# Patient Record
Sex: FEMALE
[De-identification: names, ages, dates, MRNs, and addresses within clinical notes are randomized per-mention and may not be internally consistent; named-entity substitution may affect disease eponyms.]

---

## 2020-05-09 ENCOUNTER — HOSPITAL ENCOUNTER (EMERGENCY)
Dept: HOSPITAL 5 - ED | Age: 20
Discharge: HOME | End: 2020-05-09
Payer: SELF-PAY
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
## 2020-05-09 DIAGNOSIS — S71.152A: ICD-10-CM
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
## 2020-05-09 DIAGNOSIS — Z79.899: ICD-10-CM

## 2020-05-09 DIAGNOSIS — S71.151A: Primary | ICD-10-CM

## 2020-05-09 PROCEDURE — 99282 EMERGENCY DEPT VISIT SF MDM: CPT

## 2020-05-09 PROCEDURE — 90471 IMMUNIZATION ADMIN: CPT

## 2020-05-09 PROCEDURE — 90715 TDAP VACCINE 7 YRS/> IM: CPT

## 2020-05-09 NOTE — EMERGENCY DEPARTMENT REPORT
ED Animal Bite HPI





- General


Chief Complaint: Animal Bite


Stated Complaint: DOG BITE


Time Seen by Provider: 05/09/20 09:42


Source: patient


Mode of arrival: Ambulatory


Limitations: No Limitations





- History of Present Illness


Initial Comments: 





20-year-old  female presents to the emergency room for dog bite to the 

left hip and right leg by father-in-law's dog.  Patient reports she is not sure 

when she has had her last tetanus.  It was reported that her father-in-law 

states that the dog has all of his vaccines.  Patient denies any past medical h

istory has no known drug allergies he currently takes no medications.  Last 

menstrual period was 4/25/2020 no possibility of being pregnant per patient.


MD Complaint: animal bite


Onset/Timing: 15


-: minutes(s)


Left: Thigh, Right: Thigh


Animal: dog


Description: household pet, immunizations UTD


Mechanism: bite, scratch


Severity scale (0 -10): 10


Context: playing with animal





- Related Data


Patient Tetanus UTD: No


                                  Previous Rx's











 Medication  Instructions  Recorded  Last Taken  Type


 


Acetaminophen/Codeine [Tylenol 1 tab PO Q6H PRN #12 tab 05/09/20 Unknown Rx





/Codeine # 3 tab]    


 


Amoxicillin/K Clav Tab [Augmentin 1 each PO Q8HR 10 Days #30 tablet 05/09/20 

Unknown Rx





500 MG TAB]    


 


Ibuprofen [Motrin 600 MG tab] 600 mg PO Q8H PRN #30 tablet 05/09/20 Unknown Rx











                                    Allergies











Allergy/AdvReac Type Severity Reaction Status Date / Time


 


No Known Allergies Allergy   Unverified 05/09/20 09:27














ED Review of Systems


ROS: 


Stated complaint: DOG BITE


Other details as noted in HPI








ED Past Medical Hx





- Past Medical History


Previous Medical History?: No





- Surgical History


Past Surgical History?: No





- Social History


Smoking Status: Never Smoker


Substance Use Type: None





- Medications


Home Medications: 


                                Home Medications











 Medication  Instructions  Recorded  Confirmed  Last Taken  Type


 


Acetaminophen/Codeine [Tylenol 1 tab PO Q6H PRN #12 tab 05/09/20  Unknown Rx





/Codeine # 3 tab]     


 


Amoxicillin/K Clav Tab [Augmentin 1 each PO Q8HR 10 Days #30 tablet 05/09/20  

Unknown Rx





500 MG TAB]     


 


Ibuprofen [Motrin 600 MG tab] 600 mg PO Q8H PRN #30 tablet 05/09/20  Unknown Rx














ED Physical Exam





- General


Limitations: No Limitations


General appearance: alert, in no apparent distress





- Head


Head exam: Present: atraumatic, normocephalic





- Eye


Eye exam: Present: normal appearance





- ENT


ENT exam: Present: mucous membranes moist





- Neck


Neck exam: Present: normal inspection, full ROM





- Respiratory


Respiratory exam: Present: normal lung sounds bilaterally.  Absent: respiratory 

distress





- Cardiovascular


Cardiovascular Exam: Present: regular rate, normal rhythm.  Absent: systolic 

murmur, diastolic murmur, rubs, gallop





- GI/Abdominal


GI/Abdominal exam: Present: soft.  Absent: distended, tenderness





- Extremities Exam


Extremities exam: Present: normal inspection





- Back Exam


Back exam: Present: normal inspection





- Neurological Exam


Neurological exam: Present: alert, oriented X3, normal gait





- Psychiatric


Psychiatric exam: Present: normal affect, normal mood





- Skin


Skin exam: Present: warm, dry, intact, normal color.  Absent: rash





- Expanded Skin Exam


  ** Expanded


Type of lesion: Present: bite/sting


Distribution of rash: RLE, LLE


Description of rash: Present: tenderness, erythematous, swelling, indurated





ED Course


                                   Vital Signs











  05/09/20 05/09/20





  09:28 10:02


 


Temperature 98.4 F 


 


Pulse Rate 100 H 


 


Respiratory 20 18





Rate  


 


Blood Pressure 134/80 


 


O2 Sat by Pulse 100 





Oximetry  











Critical care attestation.: 


If time is entered above; I have spent that time in minutes in the direct care 

of this critically ill patient, excluding procedure time.








ED Disposition


Clinical Impression: 


 Open wound of right thigh due to dog bite, Open wound of left thigh due to dog 

bite





Disposition: DC-01 TO HOME OR SELFCARE


Is pt being admited?: No


Does the pt Need Aspirin: No


Condition: Stable


Additional Instructions: 


Complete antibiotics as prescribed.  Take pain medication as needed.  Do not 

operate heavy machinery while taking Tylenol 3.  Please increase your water 

intake.  Follow-up with your primary care provider.


Prescriptions: 


Amoxicillin/K Clav Tab [Augmentin 500 MG TAB] 1 each PO Q8HR 10 Days #30 tablet


Ibuprofen [Motrin 600 MG tab] 600 mg PO Q8H PRN #30 tablet


 PRN Reason: Pain


Acetaminophen/Codeine [Tylenol /Codeine # 3 tab] 1 tab PO Q6H PRN #12 tab


 PRN Reason: Pain , Severe (7-10)


Referrals: 


SHANNON MCLEAN MD [Staff Physician] - 3-5 Days


[facility] [Provider Group] - 3-5 Days


Forms:  Work/School Release Form(ED)